# Patient Record
Sex: MALE | Race: WHITE | Employment: OTHER | URBAN - METROPOLITAN AREA
[De-identification: names, ages, dates, MRNs, and addresses within clinical notes are randomized per-mention and may not be internally consistent; named-entity substitution may affect disease eponyms.]

---

## 2021-11-13 DIAGNOSIS — M25.511 RIGHT SHOULDER PAIN: Primary | ICD-10-CM

## 2021-11-18 DIAGNOSIS — M25.511 RIGHT SHOULDER PAIN: ICD-10-CM

## 2021-11-22 NOTE — PROGRESS NOTES
Celeste Clinton (: 1955) is a 77 y.o. male, patient, here for evaluation of the following chief complaint(s):  Shoulder Pain (right)       HPI:    He was last seen for his right shoulder pain on 10/29/2021. Since then, the patient did have an MRI performed of his right shoulder on 2021. The patient states that his pain level is the same as it was at his last visit. He rates the severity of his right shoulder pain is a 4 5 out of 10. He describes his pain as sharp and intermittent. Right shoulder MRI results:    1. Mild tendinopathy of the supraspinatus and superior subscapularis without tear  2. Mild acromioclavicular degenerative change  3. Tendinopathy intra-articular biceps long head tendon    No Known Allergies    Current Outpatient Medications   Medication Sig    simvastatin (ZOCOR) 40 mg tablet TAKE 1 TABLET BY MOUTH EVERY DAY    ALPRAZolam (XANAX) 0.25 mg tablet TAKE 1 TABLET BY MOUTH EVERY 6 HOURS IF NEEDED    zolpidem (AMBIEN) 10 mg tablet TAKE 1 TABLET EVERY NIGHT AT BEDTIME AS NEEDED    citalopram (CELEXA) 40 mg tablet Take 1 Tab by mouth daily.  Cholecalciferol, Vitamin D3, 1,000 unit Cap Take  by mouth. No current facility-administered medications for this visit.        Past Medical History:   Diagnosis Date    Hypercholesterolemia     Prostatitis     recurrent    Seasonal allergies     hay fever        Past Surgical History:   Procedure Laterality Date    ABDOMEN SURGERY PROC UNLISTED      herniorrhaphy    HX ORTHOPAEDIC      AS knee (x3) jennifer., Left elbow       Family History   Problem Relation Age of Onset    Alcohol abuse Father     Cancer Father         lung    High Cholesterol Mother         Social History     Socioeconomic History    Marital status:      Spouse name: Not on file    Number of children: Not on file    Years of education: Not on file    Highest education level: Not on file   Occupational History    Not on file   Tobacco Use    Smoking status: Never Smoker    Smokeless tobacco: Not on file   Substance and Sexual Activity    Alcohol use: No    Drug use: Not on file    Sexual activity: Not on file   Other Topics Concern    Not on file   Social History Narrative    Not on file     Social Determinants of Health     Financial Resource Strain:     Difficulty of Paying Living Expenses: Not on file   Food Insecurity:     Worried About Running Out of Food in the Last Year: Not on file    Mathew of Food in the Last Year: Not on file   Transportation Needs:     Lack of Transportation (Medical): Not on file    Lack of Transportation (Non-Medical): Not on file   Physical Activity:     Days of Exercise per Week: Not on file    Minutes of Exercise per Session: Not on file   Stress:     Feeling of Stress : Not on file   Social Connections:     Frequency of Communication with Friends and Family: Not on file    Frequency of Social Gatherings with Friends and Family: Not on file    Attends Mandaeism Services: Not on file    Active Member of [address] [place] [address] or Organizations: Not on file    Attends Club or Organization Meetings: Not on file    Marital Status: Not on file   Intimate Partner Violence:     Fear of Current or Ex-Partner: Not on file    Emotionally Abused: Not on file    Physically Abused: Not on file    Sexually Abused: Not on file   Housing Stability:     Unable to Pay for Housing in the Last Year: Not on file    Number of [place] in the Last Year: Not on file    Unstable Housing in the Last Year: Not on file       Review of Systems   All other systems reviewed and are negative. Vitals: There were no vitals taken for this visit. There is no height or weight on file to calculate BMI. Ortho Exam     The patient is well-developed and well-nourished. The patient presents today in alert and oriented x3 with a normal mood and affect. The patient stands with a normal weightbearing line and walks with a normal gait.     Right shoulder: No shoulder girdle atrophy. There is no soft tissue swelling, ecchymosis, abrasions, or lacerations. Active range of motion of the shoulder is limited to 130 degrees of forward flexion, 120 degrees of lateral abduction, and 70 degrees of external rotation. Internal rotation is to the SI joint and is painful. Passive range of motion is full with a painful impingement sign and painful Perez sign. Rotator cuff strength is painful to evaluate and is a 4+/5 with forward flexion and lateral abduction. External rotation strength is maintained. There is no crepitation about the joint. Palpation of the [facility] joint does reproduce discomfort and there is pain elicited with cross body abduction. Strength of the extremity is 5/5 strength at biceps/triceps/wrist extension and is comparable to the contralateral side. DTRs are intact at +2/4 and are symmetrical.  Cervical range of motion is full with no pain to palpation along the paraspinal musculature medial border of the scapula. Spurling's sign is negative. ASSESSMENT/PLAN:      1. Chronic right shoulder pain  2. Arthritis of right acromioclavicular joint  3. Biceps tendinopathy, right  4. Right rotator cuff tendinitis       Below is the assessment and plan developed based on review of pertinent history, physical exam, labs, studies, and medications. We discussed the patient's ongoing right shoulder pain and we reviewed his MRI images and results and they were consistent with mild tendinopathy of the supraspinatus and superior subscapularis without tear. Mild acromioclavicular degenerative changes. Tendinopathy of the intra-articular biceps long head tendon.   The possible treatment options were discussed with the patient and because of the duration of his increased pain, no improvement with multiple modalities of conservative management including an at-home exercise program, his physical exam, description of his pain, past x-rays, MRI images and results, and his inability to complete daily living activities without significant discomfort we both decided that surgical intervention was the best treatment plan. The risks and benefits of a right shoulder arthroscopic exam with distal clavicle resection, extensive debridement, and open biceps tenodesis were discussed in detail with the patient and he would like to proceed. We will schedule this at his convenience. In the interim, I did encourage him to ice when possible, modify his activity level based on his right shoulder pain, and use anti-inflammatory medication when necessary. The patient will also work on range of motion, strengthening, and stretching exercises with an at-home exercise program as pain tolerates. I will see him back in the office on an as-needed basis or on the day of his right shoulder surgery. No follow-ups on file. An electronic signature was used to authenticate this note.   -- Deannie Sandhoff, MD

## 2021-11-23 ENCOUNTER — OFFICE VISIT (OUTPATIENT)
Dept: ORTHOPEDIC SURGERY | Age: 66
End: 2021-11-23
Payer: MEDICARE

## 2021-11-23 DIAGNOSIS — M19.011 ARTHRITIS OF RIGHT ACROMIOCLAVICULAR JOINT: ICD-10-CM

## 2021-11-23 DIAGNOSIS — M75.81 RIGHT ROTATOR CUFF TENDINITIS: ICD-10-CM

## 2021-11-23 DIAGNOSIS — G89.29 CHRONIC RIGHT SHOULDER PAIN: Primary | ICD-10-CM

## 2021-11-23 DIAGNOSIS — M67.921 BICEPS TENDINOPATHY, RIGHT: ICD-10-CM

## 2021-11-23 DIAGNOSIS — M25.511 CHRONIC RIGHT SHOULDER PAIN: Primary | ICD-10-CM

## 2021-11-23 PROCEDURE — G8432 DEP SCR NOT DOC, RNG: HCPCS | Performed by: ORTHOPAEDIC SURGERY

## 2021-11-23 PROCEDURE — 99214 OFFICE O/P EST MOD 30 MIN: CPT | Performed by: ORTHOPAEDIC SURGERY

## 2021-11-23 PROCEDURE — G8427 DOCREV CUR MEDS BY ELIG CLIN: HCPCS | Performed by: ORTHOPAEDIC SURGERY

## 2021-11-23 PROCEDURE — G8536 NO DOC ELDER MAL SCRN: HCPCS | Performed by: ORTHOPAEDIC SURGERY

## 2021-11-23 PROCEDURE — 1101F PT FALLS ASSESS-DOCD LE1/YR: CPT | Performed by: ORTHOPAEDIC SURGERY

## 2021-11-23 PROCEDURE — 3017F COLORECTAL CA SCREEN DOC REV: CPT | Performed by: ORTHOPAEDIC SURGERY

## 2021-11-23 PROCEDURE — G8421 BMI NOT CALCULATED: HCPCS | Performed by: ORTHOPAEDIC SURGERY

## 2021-11-29 DIAGNOSIS — M67.921 BICEPS TENDINOPATHY, RIGHT: ICD-10-CM

## 2021-11-29 DIAGNOSIS — M75.81 RIGHT ROTATOR CUFF TENDINITIS: ICD-10-CM

## 2021-11-29 DIAGNOSIS — M19.011 ARTHRITIS OF RIGHT ACROMIOCLAVICULAR JOINT: Primary | ICD-10-CM

## 2021-12-03 DIAGNOSIS — M19.011 ARTHRITIS OF RIGHT ACROMIOCLAVICULAR JOINT: Primary | ICD-10-CM

## 2021-12-04 RX ORDER — OXYCODONE AND ACETAMINOPHEN 5; 325 MG/1; MG/1
1 TABLET ORAL
Qty: 40 TABLET | Refills: 0 | Status: SHIPPED | OUTPATIENT
Start: 2021-12-04 | End: 2021-12-11

## 2021-12-15 NOTE — PROGRESS NOTES
Mira Card (: 1955) is a 77 y.o. male, patient, here for evaluation of the following chief complaint(s):  Surgical Follow-up and Shoulder Pain (right)       HPI:    He is now 9 days status post right shoulder arthroscopic exam with acromioplasty, distal clavicle resection, extensive debridement of the labrum, supraspinatus, biceps, and bursa, and open biceps tenodesis. His surgery was performed on 2021. The patient states that his pain has improved since his surgical procedure. He rates the severity of his postoperative right shoulder pain as a moderate over the biceps tenodesis incision, otherwise mild. He states that he is been sleeping well. He denies any fevers or chills. The patient denies any other complaints at this time. No Known Allergies    Current Outpatient Medications   Medication Sig    simvastatin (ZOCOR) 40 mg tablet TAKE 1 TABLET BY MOUTH EVERY DAY    ALPRAZolam (XANAX) 0.25 mg tablet TAKE 1 TABLET BY MOUTH EVERY 6 HOURS IF NEEDED    zolpidem (AMBIEN) 10 mg tablet TAKE 1 TABLET EVERY NIGHT AT BEDTIME AS NEEDED    citalopram (CELEXA) 40 mg tablet Take 1 Tab by mouth daily.  Cholecalciferol, Vitamin D3, 1,000 unit Cap Take  by mouth. No current facility-administered medications for this visit.        Past Medical History:   Diagnosis Date    Hypercholesterolemia     Prostatitis     recurrent    Seasonal allergies     hay fever        Past Surgical History:   Procedure Laterality Date    HX ORTHOPAEDIC      AS knee (x3) jennifer., Left elbow    AL ABDOMEN SURGERY PROC UNLISTED      herniorrhaphy       Family History   Problem Relation Age of Onset    Alcohol abuse Father     Cancer Father         lung    High Cholesterol Mother         Social History     Socioeconomic History    Marital status:      Spouse name: Not on file    Number of children: Not on file    Years of education: Not on file    Highest education level: Not on file   Occupational History  Not on file   Tobacco Use    Smoking status: Never Smoker    Smokeless tobacco: Not on file   Substance and Sexual Activity    Alcohol use: No    Drug use: Not on file    Sexual activity: Not on file   Other Topics Concern    Not on file   Social History Narrative    Not on file     Social Determinants of Health     Financial Resource Strain:     Difficulty of Paying Living Expenses: Not on file   Food Insecurity:     Worried About Running Out of Food in the Last Year: Not on file    Mathew of Food in the Last Year: Not on file   Transportation Needs:     Lack of Transportation (Medical): Not on file    Lack of Transportation (Non-Medical): Not on file   Physical Activity:     Days of Exercise per Week: Not on file    Minutes of Exercise per Session: Not on file   Stress:     Feeling of Stress : Not on file   Social Connections:     Frequency of Communication with Friends and Family: Not on file    Frequency of Social Gatherings with Friends and Family: Not on file    Attends Confucianism Services: Not on file    Active Member of [address] [place] [address] or Organizations: Not on file    Attends Club or Organization Meetings: Not on file    Marital Status: Not on file   Intimate Partner Violence:     Fear of Current or Ex-Partner: Not on file    Emotionally Abused: Not on file    Physically Abused: Not on file    Sexually Abused: Not on file   Housing Stability:     Unable to Pay for Housing in the Last Year: Not on file    Number of [place] in the Last Year: Not on file    Unstable Housing in the Last Year: Not on file       Review of Systems   All other systems reviewed and are negative. Vitals:  Ht 5' 8\" (1.727 m)   Wt 160 lb (72.6 kg)   BMI 24.33 kg/m²    Body mass index is 24.33 kg/m². Ortho Exam     The patient is well-developed and well-nourished. The patient presents today in alert and oriented x3 with a normal mood and affect.   The patient stands with a normal weightbearing line and walks with a normal gait. Right shoulder wounds are clean and dry neurovascular intact. There is some ecchymosis but no erythema. His stitches were removed and his incisions are healing nicely with no sign of irritation or infection and look normal.  He does have good early elbow, wrist, and finger range of motion. His shoulder range of motion was not tested today. His shoulder strength was not tested today. His sensations are intact and his pulses are 2+. His skin is healing nicely. ASSESSMENT/PLAN:      1. Pain of right shoulder region  2. Status post shoulder surgery       Below is the assessment and plan developed based on review of pertinent history, physical exam, labs, studies, and medications. We discussed the patient's recent right shoulder arthroscopic exam with acromioplasty, distal clavicle resection, extensive debridement of the labrum, supraspinatus, biceps, and bursa, and open biceps tenodesis. His surgery was performed on 12/6/2021. He is now 9 days status post surgical intervention. The patient is progressing nicely in the early stages of his recovery and having the appropriate amount of expected postoperative discomfort at this time. We did remove the patient's stitches today in the office without complication. His incisions are healing nicely with no sign of irritation or infection and look normal.  He does have good early elbow and wrist range of motion. His shoulder range of motion and strength were not tested today. The patient will continue the postoperative protocol by working on range of motion, stretching, and strengthening exercises with an at-home exercise program as pain tolerates. He may continue to increase activities as tolerated and as discussed today in the office. He is to avoid any significant lifting with his right upper extremity, power biceps activities, internal and external supination, and reaching behind him with his arm fully extended.   He will continue to wear sling for comfort as needed. I did encourage him to ice when possible, modify his activity level based on his postoperative right shoulder and arm pain, and use anti-inflammatory medication when necessary. We will send him to physical therapy at his next visit if he is getting stiff. I will see him back in 2 to 3 weeks for reevaluation and further discussion of the postoperative protocol. Return in about 3 weeks (around 1/6/2022) for Re-evaluation and further discussion of the postop protocol. An electronic signature was used to authenticate this note.   -- Ceferino Solis MD

## 2021-12-16 ENCOUNTER — OFFICE VISIT (OUTPATIENT)
Dept: ORTHOPEDIC SURGERY | Age: 66
End: 2021-12-16
Payer: MEDICARE

## 2021-12-16 VITALS — HEIGHT: 68 IN | WEIGHT: 160 LBS | BODY MASS INDEX: 24.25 KG/M2

## 2021-12-16 DIAGNOSIS — M25.511 PAIN OF RIGHT SHOULDER REGION: Primary | ICD-10-CM

## 2021-12-16 DIAGNOSIS — Z98.890 STATUS POST SHOULDER SURGERY: ICD-10-CM

## 2021-12-16 PROCEDURE — 99024 POSTOP FOLLOW-UP VISIT: CPT | Performed by: ORTHOPAEDIC SURGERY

## 2022-01-07 ENCOUNTER — OFFICE VISIT (OUTPATIENT)
Dept: ORTHOPEDIC SURGERY | Age: 67
End: 2022-01-07
Payer: MEDICARE

## 2022-01-07 VITALS — HEIGHT: 68 IN | BODY MASS INDEX: 24.25 KG/M2 | WEIGHT: 160 LBS

## 2022-01-07 DIAGNOSIS — M25.511 PAIN OF RIGHT SHOULDER REGION: Primary | ICD-10-CM

## 2022-01-07 DIAGNOSIS — Z98.890 STATUS POST SHOULDER SURGERY: ICD-10-CM

## 2022-01-07 PROCEDURE — 99024 POSTOP FOLLOW-UP VISIT: CPT | Performed by: ORTHOPAEDIC SURGERY

## 2022-01-07 NOTE — PROGRESS NOTES
Faith Álvarez (: 1955) is a 79 y.o. male, patient, here for evaluation of the following chief complaint(s):  Surgical Follow-up and Shoulder Pain (right)       HPI:    He is now approximately 4-1/2 weeks status post right shoulder arthroscopic exam with acromioplasty, distal clavicle resection, extensive debridement of the labrum, supraspinatus, biceps, and bursa, and open biceps tenodesis. His surgery was performed on 2021. He was last seen on 2021. The patient reports little to no postoperative discomfort. He states that his pain has improved significantly since his surgical procedure. He rates the severity of his postoperative right shoulder pain as a 1 out of 10. He gives no detail or description of his discomfort. He has been working on range of motion, strengthening, and stretching exercises with an at-home exercise program.    No Known Allergies    Current Outpatient Medications   Medication Sig    simvastatin (ZOCOR) 40 mg tablet TAKE 1 TABLET BY MOUTH EVERY DAY    ALPRAZolam (XANAX) 0.25 mg tablet TAKE 1 TABLET BY MOUTH EVERY 6 HOURS IF NEEDED    zolpidem (AMBIEN) 10 mg tablet TAKE 1 TABLET EVERY NIGHT AT BEDTIME AS NEEDED    citalopram (CELEXA) 40 mg tablet Take 1 Tab by mouth daily.  Cholecalciferol, Vitamin D3, 1,000 unit Cap Take  by mouth. No current facility-administered medications for this visit.        Past Medical History:   Diagnosis Date    Hypercholesterolemia     Prostatitis     recurrent    Seasonal allergies     hay fever        Past Surgical History:   Procedure Laterality Date    HX ORTHOPAEDIC      AS knee (x3) jennifer., Left elbow    IN ABDOMEN SURGERY PROC UNLISTED      herniorrhaphy       Family History   Problem Relation Age of Onset    Alcohol abuse Father     Cancer Father         lung    High Cholesterol Mother         Social History     Socioeconomic History    Marital status:      Spouse name: Not on file    Number of children: Not on file    Years of education: Not on file    Highest education level: Not on file   Occupational History    Not on file   Tobacco Use    Smoking status: Never Smoker    Smokeless tobacco: Not on file   Substance and Sexual Activity    Alcohol use: No    Drug use: Not on file    Sexual activity: Not on file   Other Topics Concern    Not on file   Social History Narrative    Not on file     Social Determinants of Health     Financial Resource Strain:     Difficulty of Paying Living Expenses: Not on file   Food Insecurity:     Worried About Running Out of Food in the Last Year: Not on file    Mathew of Food in the Last Year: Not on file   Transportation Needs:     Lack of Transportation (Medical): Not on file    Lack of Transportation (Non-Medical): Not on file   Physical Activity:     Days of Exercise per Week: Not on file    Minutes of Exercise per Session: Not on file   Stress:     Feeling of Stress : Not on file   Social Connections:     Frequency of Communication with Friends and Family: Not on file    Frequency of Social Gatherings with Friends and Family: Not on file    Attends Confucianist Services: Not on file    Active Member of [address] [place] [address] or Organizations: Not on file    Attends Club or Organization Meetings: Not on file    Marital Status: Not on file   Intimate Partner Violence:     Fear of Current or Ex-Partner: Not on file    Emotionally Abused: Not on file    Physically Abused: Not on file    Sexually Abused: Not on file   Housing Stability:     Unable to Pay for Housing in the Last Year: Not on file    Number of [place] in the Last Year: Not on file    Unstable Housing in the Last Year: Not on file       Review of Systems   All other systems reviewed and are negative. Vitals:  Ht 5' 8\" (1.727 m)   Wt 160 lb (72.6 kg)   BMI 24.33 kg/m²    Body mass index is 24.33 kg/m². Ortho Exam     The patient is well-developed and well-nourished.   The patient presents today in alert and oriented x3 with a normal mood and affect. The patient stands with a normal weightbearing line and walks with a normal gait. Right shoulder wounds are clean and dry neurovascular intact. There is no ecchymosis or erythema. His incisions are well-healed with no sign of irritation or infection and look normal.  He does have full elbow and wrist range of motion. He has regained full range of motion. His strength has improved since his last visit but there is noticeable weakness compared to normal especially in his biceps muscle which is normal to be expected. His sensations are intact and his pulses are 2+. His skin is well-healed. ASSESSMENT/PLAN:      1. Pain of right shoulder region  2. Status post shoulder surgery       Below is the assessment and plan developed based on review of pertinent history, physical exam, labs, studies, and medications. We discussed the patient's right shoulder arthroscopic exam with acromioplasty, distal clavicle resection, extensive debridement of the labrum, supraspinatus, biceps, and bursa, and open biceps tenodesis. His surgery was performed on 12/6/2021. He is now approximately 4-1/2 weeks status post surgical intervention. The patient continues to progress nicely in his recovery. He reports little to no postoperative discomfort. He has essentially regained full range of motion. His strength is improving nicely. There is weakness noted compared to normal which is to be expected. The patient will continue the postoperative protocol by working on range of motion, strengthening, and stretching exercises with an at-home exercise program as pain tolerates. He is still to be cautious with any significant lifting with his right upper extremity, power biceps activities, internal and external supination, and reaching behind him with his arm fully extended.   I did encourage him to ice when possible, modify his activity level based on his postoperative right shoulder and arm pain, and use anti-inflammatory medication when necessary. I will see him back in 4 weeks for reevaluation and further discussion of the postoperative protocol. Return in about 4 weeks (around 2/4/2022) for For re-evaluation and further discussion of the postop protocol. .    An electronic signature was used to authenticate this note.   -- Gunnar Uribe MD

## 2022-04-11 ENCOUNTER — OFFICE VISIT (OUTPATIENT)
Dept: ORTHOPEDIC SURGERY | Age: 67
End: 2022-04-11
Payer: MEDICARE

## 2022-04-11 VITALS — BODY MASS INDEX: 24.25 KG/M2 | WEIGHT: 160 LBS | HEIGHT: 68 IN

## 2022-04-11 DIAGNOSIS — M79.642 LEFT HAND PAIN: Primary | ICD-10-CM

## 2022-04-11 DIAGNOSIS — M25.542 METACARPOPHALANGEAL JOINT PAIN OF LEFT HAND: ICD-10-CM

## 2022-04-11 PROCEDURE — 20610 DRAIN/INJ JOINT/BURSA W/O US: CPT | Performed by: PHYSICIAN ASSISTANT

## 2022-04-11 RX ORDER — TRIAMCINOLONE ACETONIDE 40 MG/ML
40 INJECTION, SUSPENSION INTRA-ARTICULAR; INTRAMUSCULAR ONCE
Status: COMPLETED | OUTPATIENT
Start: 2022-04-11 | End: 2022-04-11

## 2022-04-11 RX ORDER — BUPIVACAINE HYDROCHLORIDE 5 MG/ML
5 INJECTION, SOLUTION EPIDURAL; INTRACAUDAL ONCE
Status: COMPLETED | OUTPATIENT
Start: 2022-04-11 | End: 2022-04-11

## 2022-04-11 RX ADMIN — BUPIVACAINE HYDROCHLORIDE 5 MG: 5 INJECTION, SOLUTION EPIDURAL; INTRACAUDAL at 15:47

## 2022-04-11 RX ADMIN — TRIAMCINOLONE ACETONIDE 40 MG: 40 INJECTION, SUSPENSION INTRA-ARTICULAR; INTRAMUSCULAR at 15:47

## 2022-04-11 NOTE — PROGRESS NOTES
HPI: Heena Arellano (: 1955) is a 79 y.o. male, patient, here for evaluation of the following chief complaint(s): Patient presents with complaint of left MP joint pain. He has a history of injections to the joint with relief. His last injection was approximately 6 months ago and lasted this long. He presents for another injection. He has a history of left middle trigger finger release year ago and has done well with that. He denies injury/trauma. No other complaints or concerns. X-rays of the left hand obtained today. Hand Pain (Left )       Vitals:  Ht 5' 8\" (1.727 m)   Wt 160 lb (72.6 kg)   BMI 24.33 kg/m²    Body mass index is 24.33 kg/m². No Known Allergies    Current Outpatient Medications   Medication Sig    simvastatin (ZOCOR) 40 mg tablet TAKE 1 TABLET BY MOUTH EVERY DAY    ALPRAZolam (XANAX) 0.25 mg tablet TAKE 1 TABLET BY MOUTH EVERY 6 HOURS IF NEEDED    zolpidem (AMBIEN) 10 mg tablet TAKE 1 TABLET EVERY NIGHT AT BEDTIME AS NEEDED    citalopram (CELEXA) 40 mg tablet Take 1 Tab by mouth daily.  Cholecalciferol, Vitamin D3, 1,000 unit Cap Take  by mouth.      Current Facility-Administered Medications   Medication    bupivacaine (PF) (MARCAINE) 0.5 % (5 mg/mL) injection 5 mg    triamcinolone acetonide (KENALOG-40) 40 mg/mL injection 40 mg       Past Medical History:   Diagnosis Date    Hypercholesterolemia     Prostatitis     recurrent    Seasonal allergies     hay fever        Past Surgical History:   Procedure Laterality Date    HX ORTHOPAEDIC      AS knee (x3) jennifer., Left elbow    [place] ABDOMEN SURGERY PROC UNLISTED      herniorrhaphy       Family History   Problem Relation Age of Onset    Alcohol abuse Father     Cancer Father         lung    High Cholesterol Mother         Social History     Tobacco Use    Smoking status: Never Smoker    Smokeless tobacco: Not on file   Substance Use Topics    Alcohol use: No    Drug use: Not on file        Review of Systems    Constitutional: No fevers, chills, night sweats, excessive fatigue or weight loss. Musculoskeletal: No joint pain, swelling or redness. No decreased range of motion. Neurologic: No headache, blurred vision, and no areas of focal weakness or numbness. Normal gait. No sensory problems. Respiratory: No dyspnea on exertion, orthopnea, chest pain, cough or hemoptysis. Cardiovascular: No anginal chest pain, irregular heart beat, tachycardia, palpitations or orthopnea  Integumentary: No chronic rashes, inflammation, ulcerations, pruritus, petechiae, purpura, ecchymoses, or skin changes      Physical Exam    General: Alert, cooperative, no distress  Musculosketal: Left hand - Normal range of motion. Normal sensation. Tenderness to palpation at the MP joint. No erythema, edema or warmth to the joint. No ulnar subluxation of the extensor tendon appreciated. Neurologic:  CNII-XII intact, Normal strength, sensation, and reflexes throughout     XR Results (most recent):  Results from Appointment encounter on 04/11/22    XR HAND LT MIN 3 V    Narrative  Joint space narrowing consistent with osteoarthritis to include the MP joint of the middle finger. No fractures, deformities or erosions appreciated. ASSESSMENT/PLAN:  Below is the assessment and plan developed based on review of pertinent history, physical exam, labs, studies, and medications. Left MP joint pain. He has a history of injections to the joint with relief. His last injection was approximately 6 months ago and lasted this long. He has a history of left middle trigger finger release year ago and has done well with that. Mervin Guerrier He presents for another injection. Clinical exam and images are consistent with left middle MP joint osteoarthritis. Treatment options reviewed with the patient. He is opting for an other injection. Injection to the middle MP joint administered 4/11/22. He will follow up in 3-4 weeks to review his progress.      Discussed risks/benefits of cortisone injection and patient gave verbal consent. Under sterile conditions, the left middle MP joint was injected with 1 cc 5mg Bupivacaine and 1cc 40mg Triamcinolone, tolerated the procedure well. Patient consent obtained prior to the injection(s). Consent forms signed. 1. Left hand pain  -     XR HAND LT MIN 3 V; Future  2. Metacarpophalangeal joint pain of left hand  -     bupivacaine (PF) (MARCAINE) 0.5 % (5 mg/mL) injection 5 mg; 5 mg, Intra artICUlar, ONCE, 1 dose, On Mon 4/11/22 at 1600  -     triamcinolone acetonide (KENALOG-40) 40 mg/mL injection 40 mg; 40 mg, Intra artICUlar, ONCE, 1 dose, On Mon 4/11/22 at 1600      Return in about 4 weeks (around 5/9/2022). Dr. Олег Belle was available for immediate consult during this encounter. An electronic signature was used to authenticate this note.   -- Sulma Perry PA-C

## 2022-12-06 ENCOUNTER — OFFICE VISIT (OUTPATIENT)
Dept: ORTHOPEDIC SURGERY | Age: 67
End: 2022-12-06
Payer: MEDICARE

## 2022-12-06 VITALS — WEIGHT: 160 LBS | BODY MASS INDEX: 24.25 KG/M2 | HEIGHT: 68 IN

## 2022-12-06 DIAGNOSIS — M25.542 METACARPOPHALANGEAL JOINT PAIN OF LEFT HAND: ICD-10-CM

## 2022-12-06 DIAGNOSIS — M79.642 LEFT HAND PAIN: Primary | ICD-10-CM

## 2022-12-06 DIAGNOSIS — M19.042 DEGENERATIVE ARTHRITIS OF METACARPOPHALANGEAL JOINT OF MIDDLE FINGER OF LEFT HAND: ICD-10-CM

## 2022-12-06 RX ORDER — BETAMETHASONE SODIUM PHOSPHATE AND BETAMETHASONE ACETATE 3; 3 MG/ML; MG/ML
6 INJECTION, SUSPENSION INTRA-ARTICULAR; INTRALESIONAL; INTRAMUSCULAR; SOFT TISSUE ONCE
Status: COMPLETED | OUTPATIENT
Start: 2022-12-06 | End: 2022-12-06

## 2022-12-06 RX ORDER — BUPIVACAINE HYDROCHLORIDE 7.5 MG/ML
7.5 INJECTION, SOLUTION EPIDURAL; RETROBULBAR ONCE
Status: COMPLETED | OUTPATIENT
Start: 2022-12-06 | End: 2022-12-06

## 2022-12-06 RX ADMIN — BETAMETHASONE SODIUM PHOSPHATE AND BETAMETHASONE ACETATE 6 MG: 3; 3 INJECTION, SUSPENSION INTRA-ARTICULAR; INTRALESIONAL; INTRAMUSCULAR; SOFT TISSUE at 09:31

## 2022-12-06 RX ADMIN — BUPIVACAINE HYDROCHLORIDE 7.5 MG: 7.5 INJECTION, SOLUTION EPIDURAL; RETROBULBAR at 09:31

## 2022-12-06 NOTE — PROGRESS NOTES
HPI: Mague Sunshine (: 1955) is a 79 y.o. male, patient, here for evaluation of the following chief complaint(s):  Patient presents with complaint of left MP joint pain. He has a history of injections to the joint with relief. His last injection was 22 and lasted this long. He presents for another injection. He has a history of left middle trigger finger release year ago and has done well with that. He denies injury/trauma. No other complaints or concerns. X-rays of the left hand reviewed. Hand Pain (Left)           Vitals:  Ht 5' 8\" (1.727 m)   Wt 160 lb (72.6 kg)   BMI 24.33 kg/m²    Body mass index is 24.33 kg/m². No Known Allergies    Current Outpatient Medications   Medication Sig    simvastatin (ZOCOR) 40 mg tablet TAKE 1 TABLET BY MOUTH EVERY DAY    ALPRAZolam (XANAX) 0.25 mg tablet TAKE 1 TABLET BY MOUTH EVERY 6 HOURS IF NEEDED    zolpidem (AMBIEN) 10 mg tablet TAKE 1 TABLET EVERY NIGHT AT BEDTIME AS NEEDED    citalopram (CELEXA) 40 mg tablet Take 1 Tab by mouth daily. Cholecalciferol, Vitamin D3, 1,000 unit Cap Take  by mouth. Current Facility-Administered Medications   Medication    betamethasone (CELESTONE) injection 6 mg    bupivacaine (PF) (MARCAINE) 0.75 % (7.5 mg/mL) injection 7.5 mg       Past Medical History:   Diagnosis Date    Hypercholesterolemia     Prostatitis     recurrent    Seasonal allergies     hay fever        Past Surgical History:   Procedure Laterality Date    HX ORTHOPAEDIC      AS knee (x3) jennifer., Left elbow    [facility] ABDOMEN SURGERY PROC UNLISTED      herniorrhaphy       Family History   Problem Relation Age of Onset    Alcohol abuse Father     Cancer Father         lung    High Cholesterol Mother         Social History     Tobacco Use    Smoking status: Never   Substance Use Topics    Alcohol use: No        Review of Systems    Constitutional: No fevers, chills, night sweats, excessive fatigue or weight loss. Musculoskeletal: + Left MP joint pain.    Neurologic: No headache, blurred vision, and no areas of focal weakness or numbness. Normal gait. No sensory problems. Respiratory: No dyspnea on exertion, orthopnea, chest pain, cough or hemoptysis. Cardiovascular: No anginal chest pain, irregular heart beat, tachycardia, palpitations or orthopnea  Integumentary: No chronic rashes, inflammation, ulcerations, pruritus, petechiae, purpura, ecchymoses, or skin changes       Physical Exam    General: Alert, cooperative, no distress  Musculosketal: Left hand - Normal range of motion. Normal sensation. Tenderness to palpation at the middle MP joint. No erythema, edema or warmth to the joint. No ulnar subluxation of the extensor tendon appreciated. Neurologic:  CNII-XII intact, Normal strength, sensation, and reflexes throughout    XR Results (most recent):  Results from Appointment encounter on 04/11/22    XR HAND LT MIN 3 V    Narrative  Joint space narrowing consistent with osteoarthritis to include the MP joint of the middle finger. No fractures, deformities or erosions appreciated. ASSESSMENT/PLAN:  Below is the assessment and plan developed based on review of pertinent history, physical exam, labs, studies, and medications. Left MP joint pain. He has a history of injections to the joint with relief. His last injection was 4/11/22 and lasted this long. He has a history of left middle trigger finger release year ago and has done well with that. He presents for another injection. Clinical exam and images are consistent with left middle MP joint osteoarthritis. Treatment options reviewed with the patient. He is opting for an other injection. Injection to the left middle MP joint administered 12/6/22. He will follow up in 3-4 weeks to review his progress. Discussed risks/benefits of cortisone injection and patient gave verbal consent. Under sterile conditions, the left middle MP joint was injected with 1 cc 0.75% Bupivacaine and 1cc 6mg Betamethasone.  He tolerated the procedure well. Patient consent obtained prior to the injection(s). Consent forms signed. 1. Left hand pain  2. Metacarpophalangeal joint pain of left hand  -     DRAIN/INJECT SMALL JOINT/BURSA  -     betamethasone (CELESTONE) injection 6 mg; 6 mg, Intra artICUlar, ONCE, 1 dose, On Tue 12/6/22 at 1000  -     bupivacaine (PF) (MARCAINE) 0.75 % (7.5 mg/mL) injection 7.5 mg; 7.5 mg, Intra artICUlar, ONCE, 1 dose, On Tue 12/6/22 at 1000  3. Degenerative arthritis of metacarpophalangeal joint of middle finger of left hand  -     DRAIN/INJECT SMALL JOINT/BURSA  -     betamethasone (CELESTONE) injection 6 mg; 6 mg, Intra artICUlar, ONCE, 1 dose, On Tue 12/6/22 at 1000  -     bupivacaine (PF) (MARCAINE) 0.75 % (7.5 mg/mL) injection 7.5 mg; 7.5 mg, Intra artICUlar, ONCE, 1 dose, On Tue 12/6/22 at 1000    Return in about 3 weeks (around 12/27/2022). Dr. Mary Ann Owen was available for immediate consult during this encounter. An electronic signature was used to authenticate this note.   -- Na Jay PA-C

## 2023-06-21 ENCOUNTER — APPOINTMENT (OUTPATIENT)
Facility: HOSPITAL | Age: 68
End: 2023-06-21
Attending: INTERNAL MEDICINE
Payer: MEDICARE

## 2023-06-21 ENCOUNTER — HOSPITAL ENCOUNTER (OUTPATIENT)
Facility: HOSPITAL | Age: 68
Discharge: HOME | End: 2023-06-24
Attending: INTERNAL MEDICINE
Payer: MEDICARE

## 2023-06-21 DIAGNOSIS — Z87.891 PERSONAL HISTORY OF NICOTINE DEPENDENCE: ICD-10-CM

## 2023-06-21 DIAGNOSIS — Z13.6 ENCOUNTER FOR SCREENING FOR CARDIOVASCULAR DISORDERS: ICD-10-CM

## 2023-06-21 PROCEDURE — 76706 US ABDL AORTA SCREEN AAA: CPT

## 2023-07-20 ENCOUNTER — HOSPITAL ENCOUNTER (OUTPATIENT)
Facility: HOSPITAL | Age: 68
Discharge: HOME | End: 2023-07-22
Attending: INTERNAL MEDICINE
Payer: MEDICARE

## 2023-07-20 VITALS
SYSTOLIC BLOOD PRESSURE: 136 MMHG | BODY MASS INDEX: 24.26 KG/M2 | HEIGHT: 68 IN | DIASTOLIC BLOOD PRESSURE: 83 MMHG | WEIGHT: 160.05 LBS

## 2023-07-20 DIAGNOSIS — I34.0 NONRHEUMATIC MITRAL VALVE INSUFFICIENCY: ICD-10-CM

## 2023-07-20 LAB
ECHO AO ASC DIAM: 3.2 CM
ECHO AO ASCENDING AORTA INDEX: 1.72 CM/M2
ECHO AO ROOT DIAM: 3.4 CM
ECHO AO ROOT INDEX: 1.83 CM/M2
ECHO AV AREA PEAK VELOCITY: 2.5 CM2
ECHO AV AREA/BSA PEAK VELOCITY: 1.3 CM2/M2
ECHO AV PEAK GRADIENT: 5 MMHG
ECHO AV PEAK VELOCITY: 1.1 M/S
ECHO AV VELOCITY RATIO: 0.82
ECHO BSA: 1.87 M2
ECHO LA DIAMETER INDEX: 2.1 CM/M2
ECHO LA DIAMETER: 3.9 CM
ECHO LA TO AORTIC ROOT RATIO: 1.15
ECHO LA VOL 2C: 48 ML (ref 18–58)
ECHO LA VOL 2C: 51 ML (ref 18–58)
ECHO LA VOL 4C: 36 ML (ref 18–58)
ECHO LA VOL 4C: 38 ML (ref 18–58)
ECHO LA VOL BP: 43 ML (ref 18–58)
ECHO LA VOL/BSA BIPLANE: 23 ML/M2 (ref 16–34)
ECHO LA VOLUME AREA LENGTH: 46 ML
ECHO LA VOLUME INDEX AREA LENGTH: 25 ML/M2 (ref 16–34)
ECHO LV E' LATERAL VELOCITY: 10 CM/S
ECHO LV E' SEPTAL VELOCITY: 6 CM/S
ECHO LV EJECTION FRACTION A2C: 58 %
ECHO LV EJECTION FRACTION A4C: 56 %
ECHO LV FRACTIONAL SHORTENING: 36 % (ref 28–44)
ECHO LV INTERNAL DIMENSION DIASTOLE INDEX: 2.26 CM/M2
ECHO LV INTERNAL DIMENSION DIASTOLIC: 4.2 CM (ref 4.2–5.9)
ECHO LV INTERNAL DIMENSION SYSTOLIC INDEX: 1.45 CM/M2
ECHO LV INTERNAL DIMENSION SYSTOLIC: 2.7 CM
ECHO LV IVSD: 0.9 CM (ref 0.6–1)
ECHO LV MASS 2D: 109.8 G (ref 88–224)
ECHO LV MASS INDEX 2D: 59.1 G/M2 (ref 49–115)
ECHO LV POSTERIOR WALL DIASTOLIC: 0.8 CM (ref 0.6–1)
ECHO LV RELATIVE WALL THICKNESS RATIO: 0.38
ECHO LVOT AREA: 3.1 CM2
ECHO LVOT DIAM: 2 CM
ECHO LVOT PEAK GRADIENT: 4 MMHG
ECHO LVOT PEAK VELOCITY: 0.9 M/S
ECHO MV A VELOCITY: 0.61 M/S
ECHO MV AREA PHT: 3.4 CM2
ECHO MV E DECELERATION TIME (DT): 225.3 MS
ECHO MV E VELOCITY: 0.32 M/S
ECHO MV E/A RATIO: 0.52
ECHO MV E/E' LATERAL: 3.2
ECHO MV E/E' RATIO (AVERAGED): 4.27
ECHO MV E/E' SEPTAL: 5.33
ECHO MV PRESSURE HALF TIME (PHT): 65.3 MS
ECHO MV REGURGITANT PEAK GRADIENT: 104 MMHG
ECHO MV REGURGITANT PEAK VELOCITY: 5.1 M/S
ECHO PULMONARY ARTERY END DIASTOLIC PRESSURE: 7 MMHG
ECHO PV ACCELERATION TIME (AT): 82.3 MS
ECHO PV MAX VELOCITY: 0.6 M/S
ECHO PV PEAK GRADIENT: 2 MMHG
ECHO PV REGURGITANT MAX VELOCITY: 1.4 M/S
ECHO RA VOLUME: 35 ML
ECHO RA VOLUME: 37 ML
ECHO RV BASAL DIMENSION: 4.2 CM
ECHO RV FREE WALL PEAK S': 12 CM/S
ECHO RV MID DIMENSION: 3.4 CM
ECHO RV TAPSE: 1.9 CM (ref 1.7–?)
ECHO TV REGURGITANT MAX VELOCITY: 2.3 M/S
ECHO TV REGURGITANT PEAK GRADIENT: 21 MMHG

## 2023-07-20 PROCEDURE — 93306 TTE W/DOPPLER COMPLETE: CPT

## 2023-08-11 ENCOUNTER — HOSPITAL ENCOUNTER (OUTPATIENT)
Facility: HOSPITAL | Age: 68
End: 2023-08-11
Attending: INTERNAL MEDICINE
Payer: MEDICARE

## 2023-08-11 DIAGNOSIS — R05.3 CHRONIC COUGH: ICD-10-CM

## 2023-08-11 PROCEDURE — 71046 X-RAY EXAM CHEST 2 VIEWS: CPT

## 2024-01-04 PROBLEM — R79.89 HIGH SERUM CREATININE: Status: ACTIVE | Noted: 2018-12-11

## 2024-01-04 PROBLEM — R73.03 PREDIABETES: Status: ACTIVE | Noted: 2018-12-11

## 2024-01-04 PROBLEM — M25.50 MULTIPLE JOINT PAIN: Status: ACTIVE | Noted: 2017-11-02

## 2024-01-04 PROBLEM — E78.5 HYPERLIPIDEMIA: Status: ACTIVE | Noted: 2024-01-04

## 2024-01-04 PROBLEM — M25.561 PATELLOFEMORAL INSTABILITY OF BOTH KNEES WITH PAIN: Status: ACTIVE | Noted: 2024-01-04

## 2024-01-04 PROBLEM — M25.562 PATELLOFEMORAL INSTABILITY OF BOTH KNEES WITH PAIN: Status: ACTIVE | Noted: 2024-01-04

## 2024-01-04 PROBLEM — H92.09 REFERRED OTALGIA: Status: ACTIVE | Noted: 2024-01-04

## 2024-01-04 PROBLEM — M25.362 PATELLOFEMORAL INSTABILITY OF BOTH KNEES WITH PAIN: Status: ACTIVE | Noted: 2024-01-04

## 2024-01-04 PROBLEM — M25.361 PATELLOFEMORAL INSTABILITY OF BOTH KNEES WITH PAIN: Status: ACTIVE | Noted: 2024-01-04

## 2024-01-04 PROBLEM — M54.9 BACKACHE: Status: ACTIVE | Noted: 2024-01-04

## 2024-01-04 PROBLEM — F41.1 GENERALIZED ANXIETY DISORDER: Status: ACTIVE | Noted: 2017-12-28

## 2024-01-04 PROBLEM — G47.00 INSOMNIA: Status: ACTIVE | Noted: 2024-01-04

## 2024-06-07 ENCOUNTER — HOSPITAL ENCOUNTER (OUTPATIENT)
Facility: HOSPITAL | Age: 69
End: 2024-06-07
Payer: MEDICARE

## 2024-06-07 DIAGNOSIS — Z96.652 STATUS POST TOTAL LEFT KNEE REPLACEMENT: ICD-10-CM

## 2024-06-07 PROBLEM — M17.0 OSTEOARTHRITIS OF BOTH KNEES: Status: ACTIVE | Noted: 2024-04-10

## 2024-06-07 PROCEDURE — 93971 EXTREMITY STUDY: CPT
